# Patient Record
Sex: FEMALE | URBAN - METROPOLITAN AREA
[De-identification: names, ages, dates, MRNs, and addresses within clinical notes are randomized per-mention and may not be internally consistent; named-entity substitution may affect disease eponyms.]

---

## 2018-06-05 ENCOUNTER — TELEPHONE (OUTPATIENT)
Dept: ENDOCRINOLOGY | Age: 78
End: 2018-06-05

## 2024-11-06 ENCOUNTER — TELEPHONE (OUTPATIENT)
Dept: ENDOCRINOLOGY | Age: 84
End: 2024-11-06

## 2025-03-20 ENCOUNTER — TELEPHONE (OUTPATIENT)
Dept: ENDOCRINOLOGY | Age: 85
End: 2025-03-20

## 2025-03-26 RX ORDER — APIXABAN 5 MG/1
5 TABLET, FILM COATED ORAL 2 TIMES DAILY
COMMUNITY

## 2025-03-26 RX ORDER — ANASTROZOLE 1 MG/1
1 TABLET ORAL DAILY
COMMUNITY
Start: 2024-04-09

## 2025-03-26 RX ORDER — METOPROLOL SUCCINATE 50 MG/1
50 TABLET, EXTENDED RELEASE ORAL DAILY
COMMUNITY

## 2025-04-10 ENCOUNTER — OFFICE VISIT (OUTPATIENT)
Dept: ENDOCRINOLOGY | Age: 85
End: 2025-04-10
Payer: MEDICARE

## 2025-04-10 VITALS — WEIGHT: 141.6 LBS

## 2025-04-10 DIAGNOSIS — M85.89 OSTEOPENIA OF MULTIPLE SITES: ICD-10-CM

## 2025-04-10 DIAGNOSIS — M89.9 DISEASE OF SKELETAL SYSTEM: Primary | ICD-10-CM

## 2025-04-10 PROCEDURE — 1159F MED LIST DOCD IN RCRD: CPT | Performed by: INTERNAL MEDICINE

## 2025-04-10 PROCEDURE — 1123F ACP DISCUSS/DSCN MKR DOCD: CPT | Performed by: INTERNAL MEDICINE

## 2025-04-10 PROCEDURE — 99204 OFFICE O/P NEW MOD 45 MIN: CPT | Performed by: INTERNAL MEDICINE

## 2025-04-10 RX ORDER — ASCORBIC ACID 500 MG
TABLET ORAL
COMMUNITY

## 2025-04-10 RX ORDER — SODIUM PHOSPHATE,MONO-DIBASIC 19G-7G/118
1000 ENEMA (ML) RECTAL DAILY
COMMUNITY

## 2025-04-10 NOTE — PROGRESS NOTES
[facility]  [address] [place] [address] [address] [place] [address]  Phone [phone]  Fax [phone]    NAME:  CHAD TUTTLE  :  1940  CONSULT DATE:    04/10/2025  MOST RECENT VISIT:  04/10/2025  TODAY'S DATE:  04/10/2025    Labs @ [facility] 10/2024    CONSULTATION REQUESTED BY: Florentino Arreola MD  OTHERS WHO NEED REPORTS: Brittany Bruno MD    PROBLEMS.  Low bone density by DXA 2018, lowest T-score -1.2 in the left 1/3 radius    Family history of osteoporosis, none    BMD decreased 3859-1217, lowest T-score -2.5 in the left 1/3 radius  Natural menopause age 58  Breast cancer , surgery, radiation, anastrozole started 2026  Dental extraction 2025, implant  Bilateral hip replacements    CURRENT MANAGEMENT FOR BONE HEALTH/OSTEOPOROSIS.  Calcium, 300 mg from low calcium foods, 130 mg yogurt    diet MVI Ca+D other    Calcium 450 300 500 x 2  mg/d   Vitamin D  1000  1000 IU/d     25-OH D 53 ng/mL 10/2024 (desirable is 30-60 ng/mL)  Exercise, walks, hikes some  Pharmacologic therapy: none    PREVIOUS BONE-ACTIVE MEDICATIONS. none    OTHER CURRENT MEDICATIONS (SELECTED): Eliquis, metoprolol  OTC MEDICATIONS (SELECTED): none    CHIEF COMPLAINT.  “Foot drop - fall risk.  Dr. Arreola concerned about recent scan.”    HISTORY OF PRESENT ILLNESS: See problem list for chronic/inactive conditions.  Ms. Tuttle is an 84-year-old woman who was found to have  low bone density by DXA in 2018.    FOR FULL DETAILS OF FAMILY HISTORY, PAST MEDICAL AND SURGICAL HISTORY, SOCIAL HISTORY, SEE PATIENT QUESTIONNAIRE OF TODAY'S DATE.    FAMILY HISTORY.  Relevant hx in problem list and/or HPI. Otherwise not contributory.  PAST MEDICAL HISTORY.  Noted in health history form.   PAST SURGICAL HISTORY.  Noted in health history form.   SOCIAL HISTORY.  Nonsmoker.  No excessive intake of alcohol, caffeine or sodas. Lives alone.  REVIEW OF SYSTEMS. Maximum adult height 63”.  No significant height loss.